# Patient Record
Sex: MALE | Race: WHITE | NOT HISPANIC OR LATINO | Employment: UNEMPLOYED | URBAN - NONMETROPOLITAN AREA
[De-identification: names, ages, dates, MRNs, and addresses within clinical notes are randomized per-mention and may not be internally consistent; named-entity substitution may affect disease eponyms.]

---

## 2022-08-17 ENCOUNTER — OFFICE VISIT (OUTPATIENT)
Dept: URGENT CARE | Facility: CLINIC | Age: 32
End: 2022-08-17
Payer: COMMERCIAL

## 2022-08-17 VITALS
DIASTOLIC BLOOD PRESSURE: 86 MMHG | BODY MASS INDEX: 32.94 KG/M2 | WEIGHT: 179 LBS | HEIGHT: 62 IN | RESPIRATION RATE: 18 BRPM | TEMPERATURE: 98.1 F | OXYGEN SATURATION: 95 % | SYSTOLIC BLOOD PRESSURE: 159 MMHG | HEART RATE: 112 BPM

## 2022-08-17 DIAGNOSIS — K04.7 DENTAL ABSCESS: Primary | ICD-10-CM

## 2022-08-17 PROCEDURE — 99203 OFFICE O/P NEW LOW 30 MIN: CPT

## 2022-08-17 PROCEDURE — S9088 SERVICES PROVIDED IN URGENT: HCPCS

## 2022-08-17 RX ORDER — ALBUTEROL SULFATE 90 UG/1
2 AEROSOL, METERED RESPIRATORY (INHALATION) EVERY 6 HOURS PRN
COMMUNITY
Start: 2021-09-26 | End: 2022-09-26

## 2022-08-17 RX ORDER — BENZONATATE 100 MG/1
100 CAPSULE ORAL 3 TIMES DAILY PRN
COMMUNITY

## 2022-08-17 RX ORDER — MONTELUKAST SODIUM 10 MG/1
10 TABLET ORAL
COMMUNITY

## 2022-08-17 RX ORDER — NAPROXEN 500 MG/1
500 TABLET ORAL 2 TIMES DAILY WITH MEALS
Qty: 14 TABLET | Refills: 0 | Status: SHIPPED | OUTPATIENT
Start: 2022-08-17 | End: 2022-08-24

## 2022-08-17 RX ORDER — IPRATROPIUM BROMIDE AND ALBUTEROL SULFATE 2.5; .5 MG/3ML; MG/3ML
3 SOLUTION RESPIRATORY (INHALATION) 4 TIMES DAILY PRN
COMMUNITY

## 2022-08-17 RX ORDER — AMOXICILLIN AND CLAVULANATE POTASSIUM 875; 125 MG/1; MG/1
1 TABLET, FILM COATED ORAL EVERY 12 HOURS SCHEDULED
Qty: 20 TABLET | Refills: 0 | Status: SHIPPED | OUTPATIENT
Start: 2022-08-17 | End: 2022-08-27

## 2022-08-17 NOTE — PATIENT INSTRUCTIONS
Warm salt water rinses  Continue to drink room temperature and warmer fluids  Avoid cold  Take the antiinflammatory as prescribed  You have been prescribed an antibiotic  Take antibiotic as directed for the full duration  Do not stop the antibiotics just because you are feeling better  Side effects of antibiotics include diarrhea  Eat yogurt or take a probiotic or acidophilus tablet while taking this medication to help prevent diarrhea and replenish good gut bacteria

## 2022-08-17 NOTE — PROGRESS NOTES
[facility]        NAME: Yosvany Solomon is a 34 y o  male  : 1990    MRN: [id number]  DATE: 2022  TIME: 3:39 PM    Assessment and Plan   Dental abscess [K04 7]  1  Dental abscess  Ambulatory Referral to Dentistry    amoxicillin-clavulanate (AUGMENTIN) 875-125 mg per tablet    naproxen (Naprosyn) 500 mg tablet     Will treat dental infection with augmentin and naproxen  Referral to dentistry    Patient Instructions   Warm salt water rinses  Continue to drink room temperature and warmer fluids  Avoid cold  Take the antiinflammatory as prescribed  You have been prescribed an antibiotic  Take antibiotic as directed for the full duration  Do not stop the antibiotics just because you are feeling better  Side effects of antibiotics include diarrhea  Eat yogurt or take a probiotic or acidophilus tablet while taking this medication to help prevent diarrhea and replenish good gut bacteria  Follow up with PCP in 3-5 days  Proceed to  ER if symptoms worsen  Chief Complaint     Chief Complaint   Patient presents with    Dental Pain         History of Present Illness       Patient is a 34year old male who presents to the office today for dental pain  States that it started  worse last night  States has tried tylenol without relief  Ice and cold makes it worse, warmth makes it better  Denies fevers  Denies trouble swallowing  Review of Systems   Review of Systems   Constitutional: Negative for chills and fever  HENT: Positive for dental problem  Negative for drooling, postnasal drip, rhinorrhea and trouble swallowing  Respiratory: Negative for shortness of breath  Cardiovascular: Negative for chest pain  All other systems reviewed and are negative          Current Medications       Current Outpatient Medications:     albuterol (PROVENTIL HFA,VENTOLIN HFA) 90 mcg/act inhaler, Inhale 2 puffs every 6 (six) hours as needed, Disp: , Rfl:     amoxicillin-clavulanate (AUGMENTIN) 875-125 mg per tablet, Take 1 tablet by mouth every 12 (twelve) hours for 10 days, Disp: 20 tablet, Rfl: 0    benzonatate (TESSALON PERLES) 100 mg capsule, Take 100 mg by mouth 3 (three) times a day as needed for cough, Disp: , Rfl:     ipratropium-albuterol (DUO-NEB) 0 5-2 5 mg/3 mL nebulizer solution, Inhale 3 mL 4 (four) times a day as needed, Disp: , Rfl:     montelukast (SINGULAIR) 10 mg tablet, Take 10 mg by mouth, Disp: , Rfl:     naproxen (Naprosyn) 500 mg tablet, Take 1 tablet (500 mg total) by mouth 2 (two) times a day with meals for 7 days, Disp: 14 tablet, Rfl: 0    Current Allergies     Allergies as of 08/17/2022    (No Known Allergies)            The following portions of the patient's history were reviewed and updated as appropriate: allergies, current medications, past family history, past medical history, past social history, past surgical history and problem list      Past Medical History:   Diagnosis Date    Allergic     Asthma        Past Surgical History:   Procedure Laterality Date    HERNIA REPAIR         History reviewed  No pertinent family history  Medications have been verified  Objective   /86   Pulse (!) 112   Temp 98 1 °F (36 7 °C)   Resp 18   Ht 5' 2" (1 575 m)   Wt 81 2 kg (179 lb)   SpO2 95%   BMI 32 74 kg/m²        Physical Exam     Physical Exam  Vitals and nursing note reviewed  Constitutional:       General: He is not in acute distress  Appearance: Normal appearance  He is normal weight  He is not ill-appearing or toxic-appearing  HENT:      Right Ear: Hearing, tympanic membrane, ear canal and external ear normal       Left Ear: Hearing, tympanic membrane, ear canal and external ear normal       Mouth/Throat:      Dentition: Abnormal dentition  Does not have dentures  Dental tenderness, gingival swelling, dental caries and dental abscesses present  No gum lesions  Cardiovascular:      Rate and Rhythm: Regular rhythm  Tachycardia present  Pulses: Normal pulses  Pulmonary:      Effort: Pulmonary effort is normal    Neurological:      Mental Status: He is alert

## 2023-12-19 ENCOUNTER — OFFICE VISIT (OUTPATIENT)
Dept: URGENT CARE | Facility: CLINIC | Age: 33
End: 2023-12-19
Payer: COMMERCIAL

## 2023-12-19 VITALS
TEMPERATURE: 101.8 F | BODY MASS INDEX: 33.84 KG/M2 | RESPIRATION RATE: 18 BRPM | WEIGHT: 185 LBS | DIASTOLIC BLOOD PRESSURE: 90 MMHG | HEART RATE: 119 BPM | OXYGEN SATURATION: 95 % | SYSTOLIC BLOOD PRESSURE: 136 MMHG

## 2023-12-19 DIAGNOSIS — K04.7 DENTAL INFECTION: Primary | ICD-10-CM

## 2023-12-19 DIAGNOSIS — R50.9 FEVER, UNSPECIFIED FEVER CAUSE: ICD-10-CM

## 2023-12-19 PROCEDURE — 99213 OFFICE O/P EST LOW 20 MIN: CPT

## 2023-12-19 PROCEDURE — 87636 SARSCOV2 & INF A&B AMP PRB: CPT

## 2023-12-19 PROCEDURE — S9088 SERVICES PROVIDED IN URGENT: HCPCS

## 2023-12-19 RX ORDER — AMOXICILLIN 500 MG/1
500 CAPSULE ORAL EVERY 8 HOURS SCHEDULED
Qty: 21 CAPSULE | Refills: 0 | Status: SHIPPED | OUTPATIENT
Start: 2023-12-19 | End: 2023-12-26

## 2023-12-19 NOTE — PATIENT INSTRUCTIONS
You have been prescribed an antibiotic.  Take antibiotic as directed for the full duration.  Do not stop the antibiotics just because you are feeling better.  Side effects of antibiotics include diarrhea.  Eat yogurt or take a probiotic or acidophilus tablet while taking this medication to help prevent diarrhea and replenish good gut bacteria.

## 2023-12-19 NOTE — PROGRESS NOTES
[facility]        NAME: Anupam Heath is a 33 y.o. male  : 1990    MRN: [id number]  DATE: 2023  TIME: 10:49 AM    Assessment and Plan   Dental infection [K04.7]  1. Dental infection  amoxicillin (AMOXIL) 500 mg capsule      2. Fever, unspecified fever cause  COVID/FLU        Will treat dental infection with amoxicillin.  Given flu symptoms will test for influenza.    Patient Instructions     You have been prescribed an antibiotic.  Take antibiotic as directed for the full duration.  Do not stop the antibiotics just because you are feeling better.  Side effects of antibiotics include diarrhea.  Eat yogurt or take a probiotic or acidophilus tablet while taking this medication to help prevent diarrhea and replenish good gut bacteria.  Follow up with PCP in 3-5 days.  Proceed to  ER if symptoms worsen.    Chief Complaint     Chief Complaint   Patient presents with    Dental Pain    Diarrhea    Fever         History of Present Illness       Patient is a 33 year old male who presents to the office today for fever, diarrhea, and facial swelling. Notes dental pain and swelling on the front lower right side. Notes that mother was sick recently.     Dental Pain   Associated symptoms include a fever.   Diarrhea   Associated symptoms include chills, a fever and vomiting.   Fever  Associated symptoms include chills, a fever and vomiting. Pertinent negatives include no nausea.       Review of Systems   Review of Systems   Constitutional:  Positive for chills and fever.   HENT:  Positive for dental problem and facial swelling. Negative for drooling and trouble swallowing.    Respiratory:  Negative for shortness of breath.    Gastrointestinal:  Positive for diarrhea and vomiting. Negative for nausea.   All other systems reviewed and are negative.        Current Medications       Current Outpatient Medications:     amoxicillin (AMOXIL) 500 mg capsule, Take 1 capsule (500 mg total) by mouth every 8  (eight) hours for 7 days, Disp: 21 capsule, Rfl: 0    montelukast (SINGULAIR) 10 mg tablet, Take 10 mg by mouth, Disp: , Rfl:     naproxen (Naprosyn) 500 mg tablet, Take 1 tablet (500 mg total) by mouth 2 (two) times a day with meals for 7 days, Disp: 14 tablet, Rfl: 0    benzonatate (TESSALON PERLES) 100 mg capsule, Take 100 mg by mouth 3 (three) times a day as needed for cough, Disp: , Rfl:     ipratropium-albuterol (DUO-NEB) 0.5-2.5 mg/3 mL nebulizer solution, Inhale 3 mL 4 (four) times a day as needed, Disp: , Rfl:     Current Allergies     Allergies as of 12/19/2023    (No Known Allergies)            The following portions of the patient's history were reviewed and updated as appropriate: allergies, current medications, past family history, past medical history, past social history, past surgical history and problem list.     Past Medical History:   Diagnosis Date    Allergic     Asthma        Past Surgical History:   Procedure Laterality Date    HERNIA REPAIR         History reviewed. No pertinent family history.      Medications have been verified.        Objective   /90   Pulse (!) 119   Temp (!) 101.8 °F (38.8 °C)   Resp 18   Wt 83.9 kg (185 lb)   SpO2 95%   BMI 33.84 kg/m²        Physical Exam     Physical Exam  Vitals and nursing note reviewed.   Constitutional:       General: He is not in acute distress.     Appearance: He is ill-appearing.   HENT:      Right Ear: Tympanic membrane normal.      Left Ear: Tympanic membrane normal.      Nose: Nose normal.      Mouth/Throat:      Mouth: Mucous membranes are moist.      Dentition: Dental tenderness, gingival swelling and dental caries present. No dental abscesses.      Pharynx: Posterior oropharyngeal erythema present.      Tonsils: No tonsillar exudate or tonsillar abscesses. 1+ on the right. 1+ on the left.        Comments: Erythema of front lower 4 teeth on left side. With localized swelling of gum. Halitosis noted.   Cardiovascular:      Rate  and Rhythm: Regular rhythm. Tachycardia present.      Pulses: Normal pulses.      Heart sounds: Normal heart sounds.   Pulmonary:      Effort: Pulmonary effort is normal.      Breath sounds: Normal breath sounds.   Skin:     General: Skin is warm.      Capillary Refill: Capillary refill takes less than 2 seconds.   Neurological:      Mental Status: He is alert.

## 2023-12-20 LAB
FLUAV RNA RESP QL NAA+PROBE: NEGATIVE
FLUBV RNA RESP QL NAA+PROBE: NEGATIVE
SARS-COV-2 RNA RESP QL NAA+PROBE: NEGATIVE

## 2024-04-08 ENCOUNTER — OFFICE VISIT (OUTPATIENT)
Dept: URGENT CARE | Facility: CLINIC | Age: 34
End: 2024-04-08
Payer: COMMERCIAL

## 2024-04-08 VITALS
SYSTOLIC BLOOD PRESSURE: 131 MMHG | OXYGEN SATURATION: 97 % | TEMPERATURE: 99.3 F | HEART RATE: 83 BPM | DIASTOLIC BLOOD PRESSURE: 84 MMHG | RESPIRATION RATE: 20 BRPM | WEIGHT: 187.4 LBS | BODY MASS INDEX: 34.28 KG/M2

## 2024-04-08 DIAGNOSIS — L25.5 DERMATITIS DUE TO PLANTS, INCLUDING POISON IVY, SUMAC, AND OAK: Primary | ICD-10-CM

## 2024-04-08 PROCEDURE — 99213 OFFICE O/P EST LOW 20 MIN: CPT

## 2024-04-08 PROCEDURE — S9088 SERVICES PROVIDED IN URGENT: HCPCS

## 2024-04-08 RX ORDER — CLOBETASOL PROPIONATE 0.5 MG/G
CREAM TOPICAL 2 TIMES DAILY
Qty: 30 G | Refills: 0 | Status: SHIPPED | OUTPATIENT
Start: 2024-04-08

## 2024-04-08 NOTE — PROGRESS NOTES
[facility]        NAME: Anupam Heath is a 34 y.o. male  : 1990    MRN: [id number]  DATE: 2024  TIME: 1:39 PM    Assessment and Plan   Dermatitis due to plants, including poison ivy, sumac, and oak [L25.5]  1. Dermatitis due to plants, including poison ivy, sumac, and oak  clobetasol (TEMOVATE) 0.05 % cream      Rash consistent with poison ivy. Recommend steroid cream and conservative tx.      Patient Instructions   Keep clean with mild soap and water  Use the steroid cream twice daily.    Follow up with PCP in 3-5 days.  Proceed to  ER if symptoms worsen.    Chief Complaint     Chief Complaint   Patient presents with    Rash     To right hand with itch and clear drainage         History of Present Illness       Pt is a 34 year old male who presents to the office today for a rash to the right hand. Notes it started as one blister and has since gotten worse. Is itchy in nature. Has been using H2O2 and triple antibiotic ointment with minimal relief.   Is draining clear fluid.     Rash        Review of Systems   Review of Systems   Skin:  Positive for rash.   All other systems reviewed and are negative.        Current Medications       Current Outpatient Medications:     clobetasol (TEMOVATE) 0.05 % cream, Apply topically 2 (two) times a day, Disp: 30 g, Rfl: 0    montelukast (SINGULAIR) 10 mg tablet, Take 10 mg by mouth, Disp: , Rfl:     benzonatate (TESSALON PERLES) 100 mg capsule, Take 100 mg by mouth 3 (three) times a day as needed for cough (Patient not taking: Reported on 2024), Disp: , Rfl:     ipratropium-albuterol (DUO-NEB) 0.5-2.5 mg/3 mL nebulizer solution, Inhale 3 mL 4 (four) times a day as needed (Patient not taking: Reported on 2024), Disp: , Rfl:     naproxen (Naprosyn) 500 mg tablet, Take 1 tablet (500 mg total) by mouth 2 (two) times a day with meals for 7 days, Disp: 14 tablet, Rfl: 0    Current Allergies     Allergies as of 2024    (No Known Allergies)             The following portions of the patient's history were reviewed and updated as appropriate: allergies, current medications, past family history, past medical history, past social history, past surgical history and problem list.     Past Medical History:   Diagnosis Date    Allergic     Asthma        Past Surgical History:   Procedure Laterality Date    HERNIA REPAIR         History reviewed. No pertinent family history.      Medications have been verified.        Objective   /84   Pulse 83   Temp 99.3 °F (37.4 °C)   Resp 20   Wt 85 kg (187 lb 6.4 oz)   SpO2 97%   BMI 34.28 kg/m²        Physical Exam     Physical Exam  Vitals and nursing note reviewed.   Constitutional:       Appearance: Normal appearance. He is normal weight.   Cardiovascular:      Rate and Rhythm: Normal rate.      Pulses: Normal pulses.   Pulmonary:      Effort: Pulmonary effort is normal. No respiratory distress.   Skin:     General: Skin is warm.      Capillary Refill: Capillary refill takes less than 2 seconds.      Findings: Rash present. Rash is macular and vesicular. Rash is not crusting, purpuric or scaling.      Comments: Red vesicular rash to dorsum of right hand between the 1st and 2nd digits measuring 2 cm  in width by 3 cm in length.   Neurological:      Mental Status: He is alert.